# Patient Record
Sex: MALE | Race: WHITE | NOT HISPANIC OR LATINO
[De-identification: names, ages, dates, MRNs, and addresses within clinical notes are randomized per-mention and may not be internally consistent; named-entity substitution may affect disease eponyms.]

---
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
## 2025-01-10 ENCOUNTER — APPOINTMENT (OUTPATIENT)
Dept: PEDIATRICS | Facility: CLINIC | Age: 20
End: 2025-01-10
Payer: COMMERCIAL

## 2025-01-10 VITALS
WEIGHT: 192.7 LBS | BODY MASS INDEX: 24.73 KG/M2 | DIASTOLIC BLOOD PRESSURE: 72 MMHG | HEIGHT: 74 IN | SYSTOLIC BLOOD PRESSURE: 124 MMHG | HEART RATE: 54 BPM

## 2025-01-10 DIAGNOSIS — Z23 ENCOUNTER FOR IMMUNIZATION: ICD-10-CM

## 2025-01-10 DIAGNOSIS — Z00.129 ENCOUNTER FOR ROUTINE CHILD HEALTH EXAMINATION W/OUT ABNORMAL FINDINGS: ICD-10-CM

## 2025-01-10 PROCEDURE — 90471 IMMUNIZATION ADMIN: CPT

## 2025-01-10 PROCEDURE — 92551 PURE TONE HEARING TEST AIR: CPT

## 2025-01-10 PROCEDURE — 86580 TB INTRADERMAL TEST: CPT

## 2025-01-10 PROCEDURE — 96160 PT-FOCUSED HLTH RISK ASSMT: CPT | Mod: 59

## 2025-01-10 PROCEDURE — 99173 VISUAL ACUITY SCREEN: CPT | Mod: 59

## 2025-01-10 PROCEDURE — 96127 BRIEF EMOTIONAL/BEHAV ASSMT: CPT

## 2025-01-10 PROCEDURE — 99395 PREV VISIT EST AGE 18-39: CPT | Mod: 25

## 2025-01-10 PROCEDURE — 90715 TDAP VACCINE 7 YRS/> IM: CPT

## 2025-03-12 ENCOUNTER — NON-APPOINTMENT (OUTPATIENT)
Age: 20
End: 2025-03-12

## 2025-03-13 DIAGNOSIS — Z13.21 ENCOUNTER FOR SCREENING FOR NUTRITIONAL DISORDER: ICD-10-CM

## 2025-03-13 DIAGNOSIS — Z13.220 ENCOUNTER FOR SCREENING FOR LIPOID DISORDERS: ICD-10-CM

## 2025-03-13 DIAGNOSIS — Z13.0 ENCOUNTER FOR SCREENING FOR DISEASES OF THE BLOOD AND BLOOD-FORMING ORGANS AND CERTAIN DISORDERS INVOLVING THE IMMUNE MECHANISM: ICD-10-CM

## 2025-03-13 DIAGNOSIS — Z13.29 ENCOUNTER FOR SCREENING FOR OTHER SUSPECTED ENDOCRINE DISORDER: ICD-10-CM

## 2025-03-19 ENCOUNTER — NON-APPOINTMENT (OUTPATIENT)
Age: 20
End: 2025-03-19

## 2025-07-25 ENCOUNTER — NON-APPOINTMENT (OUTPATIENT)
Age: 20
End: 2025-07-25